# Patient Record
Sex: MALE | Race: WHITE | NOT HISPANIC OR LATINO | Employment: UNEMPLOYED | URBAN - METROPOLITAN AREA
[De-identification: names, ages, dates, MRNs, and addresses within clinical notes are randomized per-mention and may not be internally consistent; named-entity substitution may affect disease eponyms.]

---

## 2017-05-08 ENCOUNTER — TRANSCRIBE ORDERS (OUTPATIENT)
Dept: URGENT CARE | Facility: MEDICAL CENTER | Age: 37
End: 2017-05-08

## 2017-05-08 ENCOUNTER — HOSPITAL ENCOUNTER (OUTPATIENT)
Dept: RADIOLOGY | Facility: MEDICAL CENTER | Age: 37
Discharge: HOME | End: 2017-05-08
Attending: PHYSICIAN ASSISTANT | Admitting: FAMILY MEDICINE
Payer: COMMERCIAL

## 2017-05-08 ENCOUNTER — OFFICE VISIT (OUTPATIENT)
Dept: URGENT CARE | Facility: MEDICAL CENTER | Age: 37
End: 2017-05-08
Payer: COMMERCIAL

## 2017-05-08 DIAGNOSIS — J18.9 PNEUMONIA: ICD-10-CM

## 2017-05-08 PROCEDURE — 99283 EMERGENCY DEPT VISIT LOW MDM: CPT

## 2017-05-08 PROCEDURE — 71020 HB CHEST X-RAY 2VW FRONTAL&LATL: CPT

## 2017-05-08 PROCEDURE — G0382 LEV 3 HOSP TYPE B ED VISIT: HCPCS

## 2017-06-12 ENCOUNTER — OFFICE VISIT (OUTPATIENT)
Dept: URGENT CARE | Facility: MEDICAL CENTER | Age: 37
End: 2017-06-12
Payer: COMMERCIAL

## 2017-06-12 ENCOUNTER — APPOINTMENT (OUTPATIENT)
Dept: RADIOLOGY | Facility: MEDICAL CENTER | Age: 37
End: 2017-06-12
Payer: COMMERCIAL

## 2017-06-12 DIAGNOSIS — M54.50 LOW BACK PAIN: ICD-10-CM

## 2017-06-12 PROCEDURE — 72100 X-RAY EXAM L-S SPINE 2/3 VWS: CPT

## 2017-06-12 PROCEDURE — 99283 EMERGENCY DEPT VISIT LOW MDM: CPT

## 2017-06-12 PROCEDURE — G0382 LEV 3 HOSP TYPE B ED VISIT: HCPCS

## 2017-08-18 ENCOUNTER — OFFICE VISIT (OUTPATIENT)
Dept: URGENT CARE | Facility: MEDICAL CENTER | Age: 37
End: 2017-08-18
Payer: COMMERCIAL

## 2017-08-18 PROCEDURE — 99283 EMERGENCY DEPT VISIT LOW MDM: CPT

## 2017-08-18 PROCEDURE — G0382 LEV 3 HOSP TYPE B ED VISIT: HCPCS

## 2018-01-19 NOTE — PROGRESS NOTES
Assessment   1  Lower back pain (724 2) (M54 5)    Plan    Ketorolac Tromethamine 30 MG/ML Injection Solution; INFUSE 30  MG Intramuscular; Done: [id number] 06:45PM; Status: COMPLETE - Retrospective By Protocol Authorization Ordered     Rx By: Keenan Braun; For: Lower back pain; Dose of 30 MG; Intramuscular; FRANNY = N; Administered by: Zulema Royal: 2017 6:45:00 PM; Last Updated By: Zulema Royal; 2017 2:43:54 PM     * XR SPINE LUMBAR 2 OR 3 VIEWS INJURY; Status:Resulted - Requires Verification;   Done: 81SCQ2055 12:00AM     Order Comments:RM2       W/C; [id number];JNWMQVW; Stat;       For:Lower back pain; Ordered By:[facility]; Discussion/Summary   Discussion Summary:    Take naproxen as directed  Alternate ice and moist heat as directed  Go the ER for numbness tingling or loss sensation  Medication Side Effects Reviewed: Possible side effects of new medications were reviewed with the patient/guardian today  Understands and agrees with treatment plan: The treatment plan was reviewed with the patient/guardian  The patient/guardian understands and agrees with the treatment plan    Follow Up Instructions: Follow Up with your Primary Care Provider in 1-2 days  If your symptoms worsen, go to the nearest [place] [address] Emergency Department  Chief Complaint   Chief Complaint Free Text Note Form: started this am with lower back pain, no known injury, taking Advil      History of Present Illness   HPI: This is a 68-year-old male complaining of lower back pain since this morning  Patient denies any fever chills, nausea vomiting, diarrhea, constipation, numbness, tingling, loss sensation, bowel/bladder incontinence  Patient reports pain is worse with ambulation in transitioning from laying to standing or sitting to standing  Patient reports taking Advil with minimal relief   Patient denies ever having back pain in the past     Hospital Based Practices Required Assessment: Pain Assessment      the patient states they have pain  The pain is located in the back  (on a scale of 0 to 10, the patient rates the pain at 10 )      Abuse And Domestic Violence Screen       Yes, the patient is safe at home  -- The patient states no one is hurting them  Depression And Suicide Screen  No, the patient has not had thoughts of hurting themself  No, the patient has not felt depressed in the past 7 days  Prefered Language is  english  Primary Language is  english  Readiness To Learn: Receptive  Barriers To Learning: none  Preferred Learning: verbal      Review of Systems   Focused-Male:      Constitutional: no fever or chills, feels well, no tiredness, no recent weight loss or weight gain  ENT: no complaints of earache, no loss of hearing, no nosebleeds or nasal discharge, no sore throat or hoarseness  Cardiovascular: no complaints of slow or fast heart rate, no chest pain, no palpitations, no leg claudication or lower extremity edema  Respiratory: no complaints of shortness of breath, no wheezing or cough, no dyspnea on exertion, no orthopnea or PND  Gastrointestinal: no complaints of abdominal pain, no constipation, no nausea or vomiting, no diarrhea or bloody stools  Genitourinary: no complaints of dysuria or incontinence, no hesitancy, no nocturia, no genital lesion, no inadequacy of penile erection  Musculoskeletal: no complaints of arthralgia, no myalgia, no joint swelling or stiffness, no limb pain or swelling-- and-- as noted in HPI  Integumentary: no complaints of skin rash or lesion, no itching or dry skin, no skin wounds  Neurological: no complaints of headache, no confusion, no numbness or tingling, no dizziness or fainting  Active Problems   1  Acute bronchitis (466 0) (J20 9)   2  Asthma (493 90) (J45 909)   3  Dental infection (522 4) (K04 7)   4  Depression (311) (F32 9)    Past Medical History   1   History of Seizure, epileptic (345 90) (G40 909)  Active Problems And Past Medical History Reviewed: The active problems and past medical history were reviewed and updated today  Family History   Family History Reviewed: The family history was reviewed and updated today  Social History    · Current every day smoker (305 1) (F17 200)  Social History Reviewed: The social history was reviewed and updated today  Surgical History   1  Denied: History Of Prior Surgery  Surgical History Reviewed: The surgical history was reviewed and updated today  Current Meds    1  Albuterol Sulfate HFA AERS; Therapy: (Recorded:84Ust8712) to Recorded   2  CeleXA 40 MG Oral Tablet; Therapy: 04ITX1386 to Recorded   3  ProAir  (90 Base) MCG/ACT Inhalation Aerosol Solution; INHALE 1 TO 2 PUFFS     EVERY 4 TO 6 HOURS AS NEEDED; Therapy: 26MZA9799 to (Last YK:[id number])  Requested for: 43DEN8606 Ordered  Medication List Reviewed: The medication list was reviewed and updated today  Allergies   1  Pertussis Vaccine    Vitals   Signs   Recorded: 12Jun2017 06:17PM   Heart Rate: 88  Respiration: 22  Systolic: 078  Diastolic: 70  Weight: 834 lb   BMI Calculated: 18 31  BSA Calculated: 1 53  Pain Scale: 10    Physical Exam        Musculoskeletal      Inspection/palpation of joints, bones, and muscles: Abnormal  -- Lower back: TTP paraspinal muscles of lumbar spine, no pinpoint tenderness along spine, full range of motion pain with extension flexion, +2 DTR bilaterally, negative straight leg rise, normal squat to rise        Signatures    Electronically signed by : Jaja Muñiz, [facility]; Jan 18 2018  9:29AM EST                       (Author)     Electronically signed by : CYN Mcgill ; Jan 18 2018  4:29PM EST                       (Co-author)

## 2019-01-27 ENCOUNTER — OFFICE VISIT (OUTPATIENT)
Dept: URGENT CARE | Facility: MEDICAL CENTER | Age: 39
End: 2019-01-27
Payer: COMMERCIAL

## 2019-01-27 VITALS
OXYGEN SATURATION: 96 % | HEART RATE: 118 BPM | SYSTOLIC BLOOD PRESSURE: 104 MMHG | WEIGHT: 121.6 LBS | HEIGHT: 65 IN | DIASTOLIC BLOOD PRESSURE: 72 MMHG | TEMPERATURE: 99.2 F | RESPIRATION RATE: 18 BRPM | BODY MASS INDEX: 20.26 KG/M2

## 2019-01-27 DIAGNOSIS — J45.20 INTERMITTENT ASTHMA, UNSPECIFIED ASTHMA SEVERITY, UNSPECIFIED WHETHER COMPLICATED: ICD-10-CM

## 2019-01-27 DIAGNOSIS — K02.9 PAIN DUE TO DENTAL CARIES: Primary | ICD-10-CM

## 2019-01-27 PROCEDURE — 99203 OFFICE O/P NEW LOW 30 MIN: CPT | Performed by: PHYSICIAN ASSISTANT

## 2019-01-27 PROCEDURE — 99283 EMERGENCY DEPT VISIT LOW MDM: CPT | Performed by: PHYSICIAN ASSISTANT

## 2019-01-27 PROCEDURE — G0382 LEV 3 HOSP TYPE B ED VISIT: HCPCS | Performed by: PHYSICIAN ASSISTANT

## 2019-01-27 RX ORDER — ALBUTEROL SULFATE 90 UG/1
AEROSOL, METERED RESPIRATORY (INHALATION)
COMMUNITY

## 2019-01-27 RX ORDER — CLINDAMYCIN HYDROCHLORIDE 300 MG/1
300 CAPSULE ORAL 4 TIMES DAILY
Qty: 20 CAPSULE | Refills: 0 | Status: SHIPPED | OUTPATIENT
Start: 2019-01-27 | End: 2019-02-01

## 2019-01-27 RX ORDER — ALBUTEROL SULFATE 90 UG/1
2 AEROSOL, METERED RESPIRATORY (INHALATION) EVERY 6 HOURS PRN
Qty: 8.5 G | Refills: 0 | Status: SHIPPED | OUTPATIENT
Start: 2019-01-27

## 2019-01-27 NOTE — PROGRESS NOTES
[facility]        NAME: Mariah Villegas is a 41 y o  male  : 1980    MRN: [id number]  DATE: 2019  TIME: 5:04 PM    Assessment and Plan   Pain due to dental caries [K02 9]  1  Pain due to dental caries     2  Intermittent asthma, unspecified asthma severity, unspecified whether complicated           Patient Instructions     Dental Pain, multiple cavities   Prescribed Clindamycin to be taken as directed  Asthma  Replacement Inhaler requested by patient   Follow up with Dentist in 3-5 days  Proceed to  ER if symptoms worsen  Chief Complaint     Chief Complaint   Patient presents with    Dental Pain     Pt  with pain in his left lower teeth for the past couple days  History of Present Illness       Patient is a 41year old male presenting to the clinic with complaints of dental pain for the past 4 days  Patient states that their dental problems include multiple tooth extractions and cavities  He is managing pain with ibuprofen  Currently his pain is located on his lower left jaw  He denies any fevers, discharge, difficulty breathing, or inability to consume oral intake  He plans to follow up with a dentist after his antibiotic treatment  In addition he is requesting a replacement albuterol inhaler as he is having difficulty find his  Review of Systems   Review of Systems   Constitutional: Negative for activity change, appetite change, chills, fatigue and fever  HENT: Negative for congestion, drooling, ear pain, facial swelling, postnasal drip, rhinorrhea, sinus pain, sinus pressure, sore throat and trouble swallowing  Respiratory: Positive for wheezing  Negative for cough, choking, chest tightness and shortness of breath  Cardiovascular: Negative for chest pain  Gastrointestinal: Negative for abdominal pain  Skin: Positive for wound (See HPI, dental caries throughout remaining teeth and visible dentin at gumline)           Current Medications       Current Outpatient Prescriptions:     albuterol (PROVENTIL HFA,VENTOLIN HFA) 90 mcg/act inhaler, Inhale, Disp: , Rfl:     Current Allergies     Allergies as of 01/27/2019    (No Known Allergies)            The following portions of the patient's history were reviewed and updated as appropriate: allergies, current medications, past family history, past medical history, past social history, past surgical history and problem list      Past Medical History:   Diagnosis Date    Asthma        No past surgical history on file  No family history on file  Medications have been verified  Objective   /72   Pulse (!) 118   Temp 99 2 °F (37 3 °C) (Temporal)   Resp 18   Ht 5' 5" (1 651 m)   Wt 55 2 kg (121 lb 9 6 oz)   SpO2 96%   BMI 20 24 kg/m²        Physical Exam     Physical Exam   Constitutional: He is cooperative  He appears ill  HENT:   Head: Normocephalic and atraumatic  Nose: Right sinus exhibits no maxillary sinus tenderness and no frontal sinus tenderness  Left sinus exhibits no maxillary sinus tenderness and no frontal sinus tenderness  Numerous missing teeth, cavities, and broken teeth observed  No swelling of floor of mouth, gums, or palate area    Eyes: Pupils are equal, round, and reactive to light  Conjunctivae are normal  Right eye exhibits no discharge  Left eye exhibits no discharge  Cardiovascular: Normal rate, regular rhythm and normal heart sounds  No murmur heard  Pulmonary/Chest: Effort normal  He has wheezes  Lymphadenopathy:     He has no cervical adenopathy  Neurological: He is alert  Nursing note and vitals reviewed

## 2019-01-27 NOTE — PATIENT INSTRUCTIONS
Dental Pain, multiple cavities   Prescribed Clindamycin to be taken as directed  Asthma  Replacement Inhaler requested by patient   Follow up with Dentist in 3-5 days  Proceed to  ER if symptoms worsen

## 2019-09-25 ENCOUNTER — OFFICE VISIT (OUTPATIENT)
Dept: URGENT CARE | Facility: MEDICAL CENTER | Age: 39
End: 2019-09-25
Payer: COMMERCIAL

## 2019-09-25 VITALS
HEART RATE: 99 BPM | RESPIRATION RATE: 18 BRPM | DIASTOLIC BLOOD PRESSURE: 90 MMHG | HEIGHT: 65 IN | WEIGHT: 114.6 LBS | OXYGEN SATURATION: 96 % | BODY MASS INDEX: 19.09 KG/M2 | TEMPERATURE: 99.5 F | SYSTOLIC BLOOD PRESSURE: 126 MMHG

## 2019-09-25 DIAGNOSIS — R07.81 PLEURITIC PAIN: ICD-10-CM

## 2019-09-25 DIAGNOSIS — R06.02 SOB (SHORTNESS OF BREATH): Primary | ICD-10-CM

## 2019-09-25 PROCEDURE — 94640 AIRWAY INHALATION TREATMENT: CPT | Performed by: PHYSICIAN ASSISTANT

## 2019-09-25 RX ORDER — IPRATROPIUM BROMIDE AND ALBUTEROL SULFATE 2.5; .5 MG/3ML; MG/3ML
3 SOLUTION RESPIRATORY (INHALATION) ONCE
Status: COMPLETED | OUTPATIENT
Start: 2019-09-25 | End: 2019-09-25

## 2019-09-25 RX ADMIN — IPRATROPIUM BROMIDE AND ALBUTEROL SULFATE 3 ML: 2.5; .5 SOLUTION RESPIRATORY (INHALATION) at 15:06

## 2019-09-25 NOTE — PROGRESS NOTES
[email or web address]        NAME: Nida Snow is a 41 y o  male  : 1980    MRN: [id number]  DATE: 2019  TIME: 3:23 PM    Assessment and Plan   SOB (shortness of breath) [R06 02]  1  SOB (shortness of breath)  ipratropium-albuterol (DUO-NEB) 0 5-2 5 mg/3 mL inhalation solution 3 mL    Transfer to other facility   2  Pleuritic pain  Transfer to other facility     DuoNeb has improved breathing somewhat and wheezes have mostly cleared  Still having pain in back with breathing  Spoke with patient and explained his pain is pleuritic in nature and not reproducible on exam  Concern for possible PE  Will send to ER for further evaluation  Patient will go to [facility] evaluation  Patient Instructions     Patient sent to ER for further evaluation- concern for blood clot    Chief Complaint     Chief Complaint   Patient presents with    Chest Pain     Pt states he has chest pain on the right side on inspiration x 2 days  History of Present Illness        Patient is a 53-year-old male who presents today with pleuritic pain in the right side of his upper back  He states this is been present for about 2 days  He does have a history of asthma and has been out of his inhaler  He also reports associated shortness of breath  Denies actual chest pain or tightness  No nausea no vomiting  He has not had any upper respiratory infection, he states he has a cough but has been a smoker, smokes about a pack a day  No other medical history including cardiac disease  No recent surgery  Review of Systems   Review of Systems   Constitutional: Negative for fever  HENT: Negative for congestion, ear pain and sore throat  Eyes: Negative for redness  Respiratory: Positive for cough and shortness of breath  Cardiovascular: Negative for chest pain and leg swelling  Gastrointestinal: Negative for abdominal pain, nausea and vomiting  Skin: Negative for color change  Current Medications       Current Outpatient Medications:     albuterol (PROAIR HFA) 90 mcg/act inhaler, Inhale 2 puffs every 6 (six) hours as needed for wheezing (Patient not taking: Reported on 9/25/2019), Disp: 8 5 g, Rfl: 0    albuterol (PROVENTIL HFA,VENTOLIN HFA) 90 mcg/act inhaler, Inhale, Disp: , Rfl:   No current facility-administered medications for this visit  Current Allergies     Allergies as of 09/25/2019 - Reviewed 09/25/2019   Allergen Reaction Noted    Pertussis vaccine  05/08/2017            The following portions of the patient's history were reviewed and updated as appropriate: allergies, current medications, past family history, past medical history, past social history, past surgical history and problem list      Past Medical History:   Diagnosis Date    Asthma     Depression        History reviewed  No pertinent surgical history  Family History   Problem Relation Age of Onset    Asthma Father          Medications have been verified  Objective   /90   Pulse 99   Temp 99 5 °F (37 5 °C) (Temporal)   Resp 18   Ht 5' 5" (1 651 m)   Wt 52 kg (114 lb 9 6 oz)   SpO2 96%   BMI 19 07 kg/m²        Physical Exam     Physical Exam   Constitutional: He appears well-developed and well-nourished  HENT:   Head: Normocephalic and atraumatic  Eyes: Pupils are equal, round, and reactive to light  Neck: Normal range of motion  Cardiovascular: Normal rate, regular rhythm and intact distal pulses  Pulmonary/Chest: No accessory muscle usage  No respiratory distress  He has no decreased breath sounds  He has wheezes  He has no rhonchi  He has no rales  Abdominal: Soft  He exhibits no distension  There is no tenderness  Musculoskeletal:        Arms:       Right lower leg: Normal  He exhibits no edema  Left lower leg: Normal  He exhibits no edema  Lymphadenopathy:     He has no cervical adenopathy  Skin: Skin is warm and dry

## 2019-12-15 ENCOUNTER — OFFICE VISIT (OUTPATIENT)
Dept: URGENT CARE | Facility: MEDICAL CENTER | Age: 39
End: 2019-12-15
Payer: COMMERCIAL

## 2019-12-15 VITALS
HEIGHT: 65 IN | RESPIRATION RATE: 18 BRPM | BODY MASS INDEX: 20.26 KG/M2 | OXYGEN SATURATION: 99 % | WEIGHT: 121.6 LBS | DIASTOLIC BLOOD PRESSURE: 72 MMHG | TEMPERATURE: 99.2 F | SYSTOLIC BLOOD PRESSURE: 114 MMHG | HEART RATE: 80 BPM

## 2019-12-15 DIAGNOSIS — J06.9 UPPER RESPIRATORY TRACT INFECTION, UNSPECIFIED TYPE: Primary | ICD-10-CM

## 2019-12-15 PROCEDURE — 99283 EMERGENCY DEPT VISIT LOW MDM: CPT | Performed by: PHYSICIAN ASSISTANT

## 2019-12-15 PROCEDURE — G0382 LEV 3 HOSP TYPE B ED VISIT: HCPCS | Performed by: PHYSICIAN ASSISTANT

## 2019-12-15 PROCEDURE — 99213 OFFICE O/P EST LOW 20 MIN: CPT | Performed by: PHYSICIAN ASSISTANT

## 2019-12-15 RX ORDER — BROMPHENIRAMINE MALEATE, PSEUDOEPHEDRINE HYDROCHLORIDE, AND DEXTROMETHORPHAN HYDROBROMIDE 2; 30; 10 MG/5ML; MG/5ML; MG/5ML
5 SYRUP ORAL 3 TIMES DAILY PRN
Qty: 120 ML | Refills: 0 | Status: SHIPPED | OUTPATIENT
Start: 2019-12-15 | End: 2019-12-20

## 2019-12-15 NOTE — PROGRESS NOTES
[facility]        NAME: Nawaf Ramires is a 42 y o  male  : 1980    MRN: [id number]  DATE: December 15, 2019  TIME: 2:33 PM    Assessment and Plan   Upper respiratory tract infection, unspecified type [J06 9]  1  Upper respiratory tract infection, unspecified type  brompheniramine-pseudoephedrine-DM 30-2-10 MG/5ML syrup         Patient Instructions     1  Take Bromfed 5ml  Up to 3x daily as needed for coughing/congestion  2  Increase fluids  3  Motrin as needed for fever/body aches  4  Follow up with PCP in 3-5 days if symptoms persist   5  Continue Albuterol as needed for wheezing    Chief Complaint     Chief Complaint   Patient presents with    Cough     Pt  with cough, congestion for a couple days   Diarrhea     Pt  with a baseline of diarrhea, but states that the frequency has increased over thr past couple days  History of Present Illness       Tavia Tomas is a 27-year-old male who presents with a 2 day history of nasal discharge, coughing congestion  Patient denies any fever or vomiting, he has noticed increased diarrhea since the onset of his symptoms  Patient is a current,  active smoker  Review of Systems   Review of Systems   Constitutional: Negative  HENT: Positive for congestion, postnasal drip and rhinorrhea  Respiratory: Positive for cough  Cardiovascular: Negative  Gastrointestinal: Positive for diarrhea           Current Medications       Current Outpatient Medications:     albuterol (PROVENTIL HFA,VENTOLIN HFA) 90 mcg/act inhaler, Inhale, Disp: , Rfl:     albuterol (PROAIR HFA) 90 mcg/act inhaler, Inhale 2 puffs every 6 (six) hours as needed for wheezing (Patient not taking: Reported on 2019), Disp: 8 5 g, Rfl: 0    brompheniramine-pseudoephedrine-DM 30-2-10 MG/5ML syrup, Take 5 mL by mouth 3 (three) times a day as needed for congestion, cough or allergies for up to 5 days, Disp: 120 mL, Rfl: 0    Current Allergies     Allergies as of 12/15/2019 - Reviewed 12/15/2019   Allergen Reaction Noted    Pertussis vaccine  05/08/2017            The following portions of the patient's history were reviewed and updated as appropriate: allergies, current medications, past family history, past medical history, past social history, past surgical history and problem list      Past Medical History:   Diagnosis Date    Asthma     Depression        Past Surgical History:   Procedure Laterality Date    ABDOMINAL SURGERY      As an infant       Family History   Problem Relation Age of Onset    Asthma Father          Medications have been verified  Objective   /72   Pulse 80   Temp 99 2 °F (37 3 °C) (Temporal)   Resp 18   Ht 5' 5" (1 651 m)   Wt 55 2 kg (121 lb 9 6 oz)   SpO2 99%   BMI 20 24 kg/m²        Physical Exam     Physical Exam   Constitutional: He appears well-developed and well-nourished  No distress  HENT:   Head: Normocephalic and atraumatic  Right Ear: Tympanic membrane and ear canal normal    Left Ear: Tympanic membrane and ear canal normal    Nose: Rhinorrhea present  Mouth/Throat: Uvula is midline, oropharynx is clear and moist and mucous membranes are normal    Cardiovascular: Normal rate, regular rhythm and normal heart sounds  No murmur heard  Pulmonary/Chest: Effort normal  He has wheezes

## 2019-12-15 NOTE — PATIENT INSTRUCTIONS
1  Take Bromfed 5ml  Up to 3x daily as needed for coughing/congestion  2  Increase fluids  3  Motrin as needed for fever/body aches  4   Follow up with PCP in 3-5 days if symptoms persist   5  Continue Albuterol as needed for wheezing

## 2020-08-21 ENCOUNTER — OFFICE VISIT (OUTPATIENT)
Dept: URGENT CARE | Facility: MEDICAL CENTER | Age: 40
End: 2020-08-21

## 2020-08-21 DIAGNOSIS — Z53.21 PATIENT LEFT WITHOUT BEING SEEN: Primary | ICD-10-CM

## 2020-08-22 ENCOUNTER — OFFICE VISIT (OUTPATIENT)
Dept: URGENT CARE | Facility: CLINIC | Age: 40
End: 2020-08-22
Payer: COMMERCIAL

## 2020-08-22 VITALS
DIASTOLIC BLOOD PRESSURE: 88 MMHG | HEART RATE: 103 BPM | HEIGHT: 65 IN | TEMPERATURE: 97.5 F | BODY MASS INDEX: 19.99 KG/M2 | WEIGHT: 120 LBS | SYSTOLIC BLOOD PRESSURE: 127 MMHG | RESPIRATION RATE: 18 BRPM | OXYGEN SATURATION: 97 %

## 2020-08-22 DIAGNOSIS — K04.7 DENTAL ABSCESS: Primary | ICD-10-CM

## 2020-08-22 PROCEDURE — G0382 LEV 3 HOSP TYPE B ED VISIT: HCPCS | Performed by: PREVENTIVE MEDICINE

## 2020-08-22 PROCEDURE — 99283 EMERGENCY DEPT VISIT LOW MDM: CPT | Performed by: PREVENTIVE MEDICINE

## 2020-08-22 PROCEDURE — 99203 OFFICE O/P NEW LOW 30 MIN: CPT | Performed by: PREVENTIVE MEDICINE

## 2020-08-22 RX ORDER — AMOXICILLIN 500 MG/1
CAPSULE ORAL
Qty: 21 CAPSULE | Refills: 0 | Status: SHIPPED | OUTPATIENT
Start: 2020-08-22 | End: 2020-08-28

## 2020-08-22 NOTE — PROGRESS NOTES
[facility]        NAME: Michael Coronado is a 42 y o  male  : 1980    MRN: [id number]  DATE: 2020  TIME: 10:43 AM    Assessment and Plan   Dental abscess [K04 7]  1  Dental abscess           Patient Instructions       Follow up with PCP in 3-5 days  Proceed to  ER if symptoms worsen  Chief Complaint     Chief Complaint   Patient presents with    Dental Pain     Abscess on left side of mouth for 1 day         History of Present Illness       His teeth are massively decayed and he needs massive amounts of dental surgery and care  He now has pain in the left lower jaw over a rodding molar  Review of Systems   Review of Systems   HENT: Positive for dental problem  Current Medications       Current Outpatient Medications:     albuterol (PROAIR HFA) 90 mcg/act inhaler, Inhale 2 puffs every 6 (six) hours as needed for wheezing (Patient not taking: Reported on 2019), Disp: 8 5 g, Rfl: 0    albuterol (PROVENTIL HFA,VENTOLIN HFA) 90 mcg/act inhaler, Inhale, Disp: , Rfl:     Current Allergies     Allergies as of 2020 - Reviewed 2020   Allergen Reaction Noted    Pertussis vaccine  2017            The following portions of the patient's history were reviewed and updated as appropriate: allergies, current medications, past family history, past medical history, past social history, past surgical history and problem list      Past Medical History:   Diagnosis Date    Asthma     Depression        Past Surgical History:   Procedure Laterality Date    ABDOMINAL SURGERY      As an infant       Family History   Problem Relation Age of Onset    Asthma Father          Medications have been verified  Objective   /88   Pulse 103   Temp 97 5 °F (36 4 °C) (Temporal)   Resp 18   Ht 5' 5" (1 651 m)   Wt 54 4 kg (120 lb)   SpO2 97%   BMI 19 97 kg/m²        Physical Exam     Physical Exam  HENT:      Head:      Comments:  The teeth are massively decayed    There is some redness and some swelling around decayed tooth lower left

## 2023-07-19 ENCOUNTER — APPOINTMENT (EMERGENCY)
Dept: CT IMAGING | Facility: HOSPITAL | Age: 43
End: 2023-07-19
Payer: COMMERCIAL

## 2023-07-19 ENCOUNTER — HOSPITAL ENCOUNTER (EMERGENCY)
Facility: HOSPITAL | Age: 43
Discharge: HOME | End: 2023-07-20
Attending: EMERGENCY MEDICINE
Payer: COMMERCIAL

## 2023-07-19 VITALS
RESPIRATION RATE: 18 BRPM | OXYGEN SATURATION: 98 % | TEMPERATURE: 98.7 F | DIASTOLIC BLOOD PRESSURE: 69 MMHG | SYSTOLIC BLOOD PRESSURE: 135 MMHG | HEART RATE: 89 BPM

## 2023-07-19 DIAGNOSIS — M54.50 LOWER BACK PAIN: ICD-10-CM
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
## 2023-07-19 PROCEDURE — 99284 EMERGENCY DEPT VISIT MOD MDM: CPT

## 2023-07-19 PROCEDURE — 99284 EMERGENCY DEPT VISIT MOD MDM: CPT | Performed by: EMERGENCY MEDICINE

## 2023-07-19 PROCEDURE — 72131 CT LUMBAR SPINE W/O DYE: CPT

## 2023-07-19 PROCEDURE — 72192 CT PELVIS W/O DYE: CPT

## 2023-07-19 PROCEDURE — G1004 CDSM NDSC: HCPCS

## 2023-07-19 RX ORDER — LIDOCAINE 50 MG/G
1 PATCH TOPICAL DAILY
Qty: 7 PATCH | Refills: 0 | Status: SHIPPED | OUTPATIENT
Start: 2023-07-19 | End: 2023-07-20 | Stop reason: SDUPTHER

## 2023-07-19 RX ORDER — ACETAMINOPHEN 325 MG/1
650 TABLET ORAL ONCE
Status: COMPLETED | OUTPATIENT
Start: 2023-07-19 | End: 2023-07-19

## 2023-07-19 RX ORDER — METHOCARBAMOL 500 MG/1
500 TABLET, FILM COATED ORAL 2 TIMES DAILY
Qty: 20 TABLET | Refills: 0 | Status: SHIPPED | OUTPATIENT
Start: 2023-07-19 | End: 2023-07-20 | Stop reason: SDUPTHER

## 2023-07-19 RX ORDER — LIDOCAINE 50 MG/G
1 PATCH TOPICAL ONCE
Status: DISCONTINUED | OUTPATIENT
Start: 2023-07-19 | End: 2023-07-20 | Stop reason: HOSPADM

## 2023-07-19 RX ORDER — METHOCARBAMOL 500 MG/1
500 TABLET, FILM COATED ORAL ONCE
Status: COMPLETED | OUTPATIENT
Start: 2023-07-19 | End: 2023-07-19

## 2023-07-19 RX ORDER — NAPROXEN 250 MG/1
500 TABLET ORAL ONCE
Status: COMPLETED | OUTPATIENT
Start: 2023-07-19 | End: 2023-07-19

## 2023-07-19 RX ORDER — NAPROXEN 500 MG/1
500 TABLET ORAL 2 TIMES DAILY PRN
Qty: 20 TABLET | Refills: 0 | Status: SHIPPED | OUTPATIENT
Start: 2023-07-19 | End: 2023-07-20 | Stop reason: SDUPTHER

## 2023-07-19 RX ADMIN — NAPROXEN 500 MG: 250 TABLET ORAL at 22:03

## 2023-07-19 RX ADMIN — ACETAMINOPHEN 650 MG: 325 TABLET, FILM COATED ORAL at 22:03

## 2023-07-19 RX ADMIN — LIDOCAINE 5% 1 PATCH: 700 PATCH TOPICAL at 22:03

## 2023-07-19 RX ADMIN — METHOCARBAMOL 500 MG: 500 TABLET ORAL at 22:03

## 2023-07-20 ENCOUNTER — TELEPHONE (OUTPATIENT)
Dept: PHYSICAL THERAPY | Facility: OTHER | Age: 43
End: 2023-07-20

## 2023-07-20 RX ORDER — METHOCARBAMOL 500 MG/1
500 TABLET, FILM COATED ORAL 2 TIMES DAILY
Qty: 20 TABLET | Refills: 0 | Status: SHIPPED | OUTPATIENT
Start: 2023-07-20

## 2023-07-20 RX ORDER — LIDOCAINE 50 MG/G
1 PATCH TOPICAL DAILY
Qty: 7 PATCH | Refills: 0 | Status: SHIPPED | OUTPATIENT
Start: 2023-07-20 | End: 2023-07-27

## 2023-07-20 RX ORDER — NAPROXEN 500 MG/1
500 TABLET ORAL 2 TIMES DAILY PRN
Qty: 20 TABLET | Refills: 0 | Status: SHIPPED | OUTPATIENT
Start: 2023-07-20

## 2023-07-20 NOTE — TELEPHONE ENCOUNTER
Call placed to the patient per [facility] referral.    Voice message left for patient to call back. Phone number and hours of business provided. This is the 1st attempt to reach the patient. Will defer per protocol.
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
## 2023-07-20 NOTE — DISCHARGE INSTRUCTIONS
FINDINGS:  Bones/joints: No evidence of acute bony fracture or misalignment. Mild disc space narrowing in the  lumbar spine with mild osteophyte formation. Findings consistent with degenerative change. Mild  bilateral facet joint arthropathy. Moderate diffuse disc bulge at L1-L2. Moderate left disc protrusion at  L2-L3 on series 2, image 44. Moderate diffuse disc bulge at L3-L4. Moderate diffuse disc bulge at L4-  L5. Mild diffuse disc bulge at L5-S1. Mild hypertrophy of the ligamentum flavum at L1-L2. Mild  hypertrophy of the ligamentum flavum at L2-L3. Mild hypertrophy of the ligamentum flavum at L3-L4. Moderate hypertrophy of the ligamentum flavum at L4-L5. Mild hypertrophy of the ligamentum flavum  at L5-S1. Mild narrowing of the spinal canal at L1-L2. Moderate narrowing of the spinal canal at L2-  L3. Moderate narrowing of the spinal canal at L3-L4. Moderate narrowing of the spinal canal at L4-L5. IMPRESSION:  1. No evidence of acute bony fracture or misalignment. 2. Facet joint arthropathy, disc bulges/protrusion, and hypertrophy of the ligamentum flavum as  described above. 3. Multilevel spinal canal narrowing as summarized above. IMPRESSION:  1. No evidence of acute bony fracture or dislocation. 2. Bladder wall thickening, suggesting cystitis. 3. Mild wall thickening in the sigmoid colon may be related to underdistention or colitis. 4. Multiple prominent small bowel loops with air-fluid levels, not dilated by CT criteria. This may be  related to ileus or enteritis. The bowel is incompletely imaged.

## 2023-07-20 NOTE — ED NOTES
alert and oriented assisted to the wheelchair and to a lyft to transport home.  Continues to c/o of spasm to the back     Vivien Berrios, ALEC  07/20/23 [id number]

## 2023-07-20 NOTE — ED NOTES
Patient transported to [address] [place] [address], [address] [place] [address]  07/19/23 [id number]

## 2023-07-24 NOTE — TELEPHONE ENCOUNTER
Call placed to the patient per [facility] referral.    Voice message left for patient to call back. Phone number and hours of business provided. This the 2nd attempt to reach the patient. Will defer per protocol.